# Patient Record
(demographics unavailable — no encounter records)

---

## 2025-02-05 NOTE — PHYSICAL EXAM
[FreeTextEntry1] : Vital signs were recorded and unremarkable.  Head neck, ear nose and throat is unremarkable.  Chest is clear heart sounds are normal respiratory rate is 16/min no labored breathing.  Mild pretibial edema is noted she is moderately obese.  Her neurological examination today is completely normal as delineated.  Specifically I did not notice any bulbar dysfunction fasciculations spasticity or hypotonia no Babinski sign normal station and gait. [General Appearance - In No Acute Distress] : in no acute distress [General Appearance - Alert] : alert [Oriented To Time, Place, And Person] : oriented to person, place, and time [Impaired Insight] : insight and judgment were intact [Affect] : the affect was normal [Person] : oriented to person [Place] : oriented to place [Time] : oriented to time [Concentration Intact] : normal concentrating ability [Visual Intact] : visual attention was ~T not ~L decreased [Naming Objects] : no difficulty naming common objects [Repeating Phrases] : no difficulty repeating a phrase [Writing A Sentence] : no difficulty writing a sentence [Fluency] : fluency intact [Comprehension] : comprehension intact [Reading] : reading intact [Past History] : adequate knowledge of personal past history [Cranial Nerves Optic (II)] : visual acuity intact bilaterally,  visual fields full to confrontation, pupils equal round and reactive to light [Cranial Nerves Oculomotor (III)] : extraocular motion intact [Cranial Nerves Trigeminal (V)] : facial sensation intact symmetrically [Cranial Nerves Vestibulocochlear (VIII)] : hearing was intact bilaterally [Cranial Nerves Facial (VII)] : face symmetrical [Cranial Nerves Glossopharyngeal (IX)] : tongue and palate midline [Cranial Nerves Accessory (XI - Cranial And Spinal)] : head turning and shoulder shrug symmetric [Cranial Nerves Hypoglossal (XII)] : there was no tongue deviation with protrusion [Motor Tone] : muscle tone was normal in all four extremities [Motor Strength] : muscle strength was normal in all four extremities [No Muscle Atrophy] : normal bulk in all four extremities [Sensation Tactile Decrease] : light touch was intact [Abnormal Walk] : normal gait [Balance] : balance was intact [Past-pointing] : there was no past-pointing [Tremor] : no tremor present [2+] : Ankle jerk left 2+ [Plantar Reflex Right Only] : normal on the right [Plantar Reflex Left Only] : normal on the left

## 2025-02-05 NOTE — DATA REVIEWED
[de-identified] : I reviewed her cervical spine MRI and advised her that there is no evidence of intra spinal disease with normal spinal cord and nerve roots. [de-identified] : I reviewed her EMG study and while she has history of fasciculations I did not notice any during the evaluation and the entire study was completely normal and she was reassured.

## 2025-02-05 NOTE — DATA REVIEWED
[de-identified] : I reviewed her cervical spine MRI and advised her that there is no evidence of intra spinal disease with normal spinal cord and nerve roots. [de-identified] : I reviewed her EMG study and while she has history of fasciculations I did not notice any during the evaluation and the entire study was completely normal and she was reassured.

## 2025-02-05 NOTE — PHYSICAL EXAM
[FreeTextEntry1] : Vital signs were recorded and unremarkable.  Head neck, ear nose and throat is unremarkable.  Chest is clear heart sounds are normal respiratory rate is 16/min no labored breathing.  Mild pretibial edema is noted she is moderately obese.  Her neurological examination today is completely normal as delineated.  Specifically I did not notice any bulbar dysfunction fasciculations spasticity or hypotonia no Babinski sign normal station and gait. [General Appearance - In No Acute Distress] : in no acute distress [General Appearance - Alert] : alert [Oriented To Time, Place, And Person] : oriented to person, place, and time [Impaired Insight] : insight and judgment were intact [Affect] : the affect was normal [Person] : oriented to person [Place] : oriented to place [Time] : oriented to time [Concentration Intact] : normal concentrating ability [Visual Intact] : visual attention was ~T not ~L decreased [Naming Objects] : no difficulty naming common objects [Repeating Phrases] : no difficulty repeating a phrase [Fluency] : fluency intact [Writing A Sentence] : no difficulty writing a sentence [Comprehension] : comprehension intact [Reading] : reading intact [Past History] : adequate knowledge of personal past history [Cranial Nerves Optic (II)] : visual acuity intact bilaterally,  visual fields full to confrontation, pupils equal round and reactive to light [Cranial Nerves Oculomotor (III)] : extraocular motion intact [Cranial Nerves Trigeminal (V)] : facial sensation intact symmetrically [Cranial Nerves Facial (VII)] : face symmetrical [Cranial Nerves Vestibulocochlear (VIII)] : hearing was intact bilaterally [Cranial Nerves Glossopharyngeal (IX)] : tongue and palate midline [Cranial Nerves Accessory (XI - Cranial And Spinal)] : head turning and shoulder shrug symmetric [Cranial Nerves Hypoglossal (XII)] : there was no tongue deviation with protrusion [Motor Tone] : muscle tone was normal in all four extremities [Motor Strength] : muscle strength was normal in all four extremities [No Muscle Atrophy] : normal bulk in all four extremities [Sensation Tactile Decrease] : light touch was intact [Abnormal Walk] : normal gait [Balance] : balance was intact [Past-pointing] : there was no past-pointing [Tremor] : no tremor present [2+] : Ankle jerk left 2+ [Plantar Reflex Right Only] : normal on the right [Plantar Reflex Left Only] : normal on the left

## 2025-02-05 NOTE — PHYSICAL EXAM
[FreeTextEntry1] : Vital signs were recorded and unremarkable.  Head neck, ear nose and throat is unremarkable.  Chest is clear heart sounds are normal respiratory rate is 16/min no labored breathing.  Mild pretibial edema is noted she is moderately obese.  Her neurological examination today is completely normal as delineated.  Specifically I did not notice any bulbar dysfunction fasciculations spasticity or hypotonia no Babinski sign normal station and gait. [General Appearance - Alert] : alert [General Appearance - In No Acute Distress] : in no acute distress [Oriented To Time, Place, And Person] : oriented to person, place, and time [Impaired Insight] : insight and judgment were intact [Affect] : the affect was normal [Person] : oriented to person [Place] : oriented to place [Time] : oriented to time [Concentration Intact] : normal concentrating ability [Visual Intact] : visual attention was ~T not ~L decreased [Naming Objects] : no difficulty naming common objects [Repeating Phrases] : no difficulty repeating a phrase [Fluency] : fluency intact [Writing A Sentence] : no difficulty writing a sentence [Comprehension] : comprehension intact [Reading] : reading intact [Past History] : adequate knowledge of personal past history [Cranial Nerves Oculomotor (III)] : extraocular motion intact [Cranial Nerves Optic (II)] : visual acuity intact bilaterally,  visual fields full to confrontation, pupils equal round and reactive to light [Cranial Nerves Trigeminal (V)] : facial sensation intact symmetrically [Cranial Nerves Facial (VII)] : face symmetrical [Cranial Nerves Vestibulocochlear (VIII)] : hearing was intact bilaterally [Cranial Nerves Glossopharyngeal (IX)] : tongue and palate midline [Cranial Nerves Accessory (XI - Cranial And Spinal)] : head turning and shoulder shrug symmetric [Cranial Nerves Hypoglossal (XII)] : there was no tongue deviation with protrusion [Motor Tone] : muscle tone was normal in all four extremities [No Muscle Atrophy] : normal bulk in all four extremities [Motor Strength] : muscle strength was normal in all four extremities [Sensation Tactile Decrease] : light touch was intact [Abnormal Walk] : normal gait [Balance] : balance was intact [Past-pointing] : there was no past-pointing [Tremor] : no tremor present [2+] : Ankle jerk left 2+ [Plantar Reflex Right Only] : normal on the right [Plantar Reflex Left Only] : normal on the left

## 2025-02-05 NOTE — DATA REVIEWED
[de-identified] : I reviewed her cervical spine MRI and advised her that there is no evidence of intra spinal disease with normal spinal cord and nerve roots. [de-identified] : I reviewed her EMG study and while she has history of fasciculations I did not notice any during the evaluation and the entire study was completely normal and she was reassured.

## 2025-02-05 NOTE — HISTORY OF PRESENT ILLNESS
[FreeTextEntry1] : Ms. 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 Cortes.  I also reassured her that MRI of the cervical spine was negative for any spinal cord dysfunction.  Recently her   of complications of cirrhosis of liver which was another depressive episode in her life however she is currently employed as a  in a [employer] firm working full-time and is able to carry out all activities of daily living including her profession and has been under the care of Dr. Carlie Daniel with yearly checkup and since her vitamin B12 levels were slightly low she is on supplementation including vitamin D.  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  She continues to experience sporadic muscle twitching but without any significant myalgia at this time and denied any headache diplopia dysarthria dysphagia dyspnea focal or lateralized weakness tingling or numbness neck muscle weakness.  Her height is 5 feet 5 inches and weight is 211 pounds and has gained approximately 15 pounds over the last several months and stated that she eats when she is stressed.  She has no children and stated that since 2024 there is a minimal way brain fog with disjointed thoughts when she wakes up at night but it has not affected her work as a  and her mother has not noticed it.  She had COVID infection in 2023 and the second episode 6 weeks later and was advised that sometimes COVID infection does cause mild brain fog but it could also be anxiety.  Review of systems is unremarkable and while she sporadically notices fasciculations there is no focal motor weakness in any part of her body including bulbar functions and limbs there is no spasticity abnormal involuntary movements.  Detailed review of system was otherwise unremarkable except for recent worsening of her anxiety and depression due to spousal loss but she is able to carry out all activities of daily living including working as a .  She is able to handle her finances.  I reviewed all her medications and allergies.  I also spoke with her mother who has not noticed any cognitive or language dysfunction focal or lateralized weakness or any other neurological symptoms including no evidence of cognitive decline.

## 2025-02-05 NOTE — HISTORY OF PRESENT ILLNESS
[FreeTextEntry1] : Ms. 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 Cortes.  I also reassured her that MRI of the cervical spine was negative for any spinal cord dysfunction.  Recently her   of complications of cirrhosis of liver which was another depressive episode in her life however she is currently employed as a  in a [employer] firm working full-time and is able to carry out all activities of daily living including her profession and has been under the care of Dr. Carlie Daniel with yearly checkup and since her vitamin B12 levels were slightly low she is on supplementation including vitamin D.  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  She continues to experience sporadic muscle twitching but without any significant myalgia at this time and denied any headache diplopia dysarthria dysphagia dyspnea focal or lateralized weakness tingling or numbness neck muscle weakness.  Her height is 5 feet 5 inches and weight is 211 pounds and has gained approximately 15 pounds over the last several months and stated that she eats when she is stressed.  She has no children and stated that since 2024 there is a minimal way brain fog with disjointed thoughts when she wakes up at night but it has not affected her work as a  and her mother has not noticed it.  She had COVID infection in 2023 and the second episode 6 weeks later and was advised that sometimes COVID infection does cause mild brain fog but it could also be anxiety.  Review of systems is unremarkable and while she sporadically notices fasciculations there is no focal motor weakness in any part of her body including bulbar functions and limbs there is no spasticity abnormal involuntary movements.  Detailed review of system was otherwise unremarkable except for recent worsening of her anxiety and depression due to spousal loss but she is able to carry out all activities of daily living including working as a .  She is able to handle her finances.  I reviewed all her medications and allergies.  I also spoke with her mother who has not noticed any cognitive or language dysfunction focal or lateralized weakness or any other neurological symptoms including no evidence of cognitive decline.

## 2025-02-05 NOTE — HISTORY OF PRESENT ILLNESS
[FreeTextEntry1] : Ms. 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 Cortes.  I also reassured her that MRI of the cervical spine was negative for any spinal cord dysfunction.  Recently her   of complications of cirrhosis of liver which was another depressive episode in her life however she is currently employed as a  in a [employer] firm working full-time and is able to carry out all activities of daily living including her profession and has been under the care of Dr. Carlie Daniel with yearly checkup and since her vitamin B12 levels were slightly low she is on supplementation including vitamin D.  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  She continues to experience sporadic muscle twitching but without any significant myalgia at this time and denied any headache diplopia dysarthria dysphagia dyspnea focal or lateralized weakness tingling or numbness neck muscle weakness.  Her height is 5 feet 5 inches and weight is 211 pounds and has gained approximately 15 pounds over the last several months and stated that she eats when she is stressed.  She has no children and stated that since 2024 there is a minimal way brain fog with disjointed thoughts when she wakes up at night but it has not affected her work as a  and her mother has not noticed it.  She had COVID infection in 2023 and the second episode 6 weeks later and was advised that sometimes COVID infection does cause mild brain fog but it could also be anxiety.  Review of systems is unremarkable and while she sporadically notices fasciculations there is no focal motor weakness in any part of her body including bulbar functions and limbs there is no spasticity abnormal involuntary movements.  Detailed review of system was otherwise unremarkable except for recent worsening of her anxiety and depression due to spousal loss but she is able to carry out all activities of daily living including working as a .  She is able to handle her finances.  I reviewed all her medications and allergies.  I also spoke with her mother who has not noticed any cognitive or language dysfunction focal or lateralized weakness or any other neurological symptoms including no evidence of cognitive decline.

## 2025-02-05 NOTE — DISCUSSION/SUMMARY
[FreeTextEntry1] : Opinion-the patient has historically benign fasciculation myalgia syndrome with significant anxiety and anxiety with depression with recent spousal loss but his functional without any evidence of neurological disorder such as motor neuron disease and she was reassured and advised to return back for repeat EMG study to reassure that there are no neuromuscular disorders.  She expressed understanding and will see Dr. Gitelman since her insurance has now changed and I had already forwarded her a referral.  She was advised to keep in touch by phone if there are any changes and had 5-minute counseling with her in the presence of her mother and reassured that there is no neuromuscular disorder at this time and will carefully watch her.

## 2025-03-26 NOTE — REASON FOR VISIT
[FreeTextEntry4] : 5:30 [FreeTextEntry5] : 6:15 [FreeTextEntry1] : Pt. is a 40 yo female with long-standing history of anxiety and medical history significant for Michelle Syndrome.   In March 2024 she developed muscle twitching in upper and lower extremities, eyelids and cheeks with accompanying muscle soreness, weakness, tingling and generalized fatigue.  Pt. has undergone multiple medical consultations with different specialists and various diagnositic tests/imagaing with no significant findings.  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  While pt. understand that this diagnosis is not serious or life threatening, she reports that she continues to experience tremendous anxiety around her symptoms.   In particular, she continues to have worries about the possibility of a motor neuron disease.  In addition to her health-related anxiety, pt. also suddenly lost her  4 months ago. Dr. Baig recommended that pt. see me for psychological evaluation and CBT oriented psychotherapy to help process her grief and manage her anxiety.

## 2025-03-26 NOTE — PLAN
[FreeTextEntry2] : 1. Pt. grieving after sudden loss of her  4 months ago. 2. Pt. also grieving the life she thought she would lead.  3. Pt. with significant health related anxiety. 4. Pt. with recurrent catastrophic thoughts and focus on her symptoms.  5. Pt. at times struggling to function due to anxiety. 6. Pt. struggling with self-care in the face of multiple stressors, anxiety and grief.  Will utilize a supportive and CBT approach to help pt. process her losses and work through her giref.  Will help pt. develop awareness of the mind-body connection and develop strategies for co-existing with rather than reacting to symptoms.  Will also work to shift pt. away from catastrophic thoughts thus decreasing health related anxiety.  Finally, will work to help pt. get back to healthy self-care behaviors to improve quality of life.  [de-identified] : Session conducted via teleheatl, mental status WNL.  Pt. reported ongoing anxiety and concern around symptoms but noted greater awareness of distortions and their impact on her emotions.  Reviewed examples that pt. observed throughout the week.  Introduced thought challenging techniques and utilized them with pt's examples.   [FreeTextEntry1] : Pt. to engage in short-term CBT oriented psychotherapy.

## 2025-03-26 NOTE — PLAN
[FreeTextEntry2] : 1. Pt. grieving after sudden loss of her  4 months ago. 2. Pt. also grieving the life she thought she would lead.  3. Pt. with significant health related anxiety. 4. Pt. with recurrent catastrophic thoughts and focus on her symptoms.  5. Pt. at times struggling to function due to anxiety. 6. Pt. struggling with self-care in the face of multiple stressors, anxiety and grief.  Will utilize a supportive and CBT approach to help pt. process her losses and work through her giref.  Will help pt. develop awareness of the mind-body connection and develop strategies for co-existing with rather than reacting to symptoms.  Will also work to shift pt. away from catastrophic thoughts thus decreasing health related anxiety.  Finally, will work to help pt. get back to healthy self-care behaviors to improve quality of life.  [de-identified] : Session conducted via teleheatl, mental status WNL.  Pt. reported ongoing anxiety and concern around symptoms but noted greater awareness of distortions and their impact on her emotions.  Reviewed examples that pt. observed throughout the week.  Introduced thought challenging techniques and utilized them with pt's examples.   [FreeTextEntry1] : Pt. to engage in short-term CBT oriented psychotherapy.

## 2025-04-07 NOTE — PLAN
[FreeTextEntry2] : 1. Pt. grieving after sudden loss of her  4 months ago. 2. Pt. also grieving the life she thought she would lead.  3. Pt. with significant health related anxiety. 4. Pt. with recurrent catastrophic thoughts and focus on her symptoms.  5. Pt. at times struggling to function due to anxiety. 6. Pt. struggling with self-care in the face of multiple stressors, anxiety and grief.  Will utilize a supportive and CBT approach to help pt. process her losses and work through her giref.  Will help pt. develop awareness of the mind-body connection and develop strategies for co-existing with rather than reacting to symptoms.  Will also work to shift pt. away from catastrophic thoughts thus decreasing health related anxiety.  Finally, will work to help pt. get back to healthy self-care behaviors to improve quality of life.  [Cognitive and/or Behavior Therapy] : Cognitive and/or Behavior Therapy  [Supportive Therapy] : Supportive Therapy [de-identified] : Session conducted via teleheatl, mental status WNL.  Pt. reported ongoing anxiety and concern around symptoms but noted greater awareness of distortions and their impact on her emotions.  Reviewed examples that pt. observed throughout the week.  Introduced thought challenging techniques and utilized them with pt's examples.   [FreeTextEntry1] : Pt. to engage in short-term CBT oriented psychotherapy.

## 2025-04-07 NOTE — PLAN
[FreeTextEntry2] : 1. Pt. grieving after sudden loss of her  4 months ago. 2. Pt. also grieving the life she thought she would lead.  3. Pt. with significant health related anxiety. 4. Pt. with recurrent catastrophic thoughts and focus on her symptoms.  5. Pt. at times struggling to function due to anxiety. 6. Pt. struggling with self-care in the face of multiple stressors, anxiety and grief.  Will utilize a supportive and CBT approach to help pt. process her losses and work through her giref.  Will help pt. develop awareness of the mind-body connection and develop strategies for co-existing with rather than reacting to symptoms.  Will also work to shift pt. away from catastrophic thoughts thus decreasing health related anxiety.  Finally, will work to help pt. get back to healthy self-care behaviors to improve quality of life.  [Cognitive and/or Behavior Therapy] : Cognitive and/or Behavior Therapy  [Supportive Therapy] : Supportive Therapy [de-identified] : Session conducted via teleheatl, mental status WNL.  Pt. reported ongoing anxiety and concern around symptoms but noted greater awareness of distortions and their impact on her emotions.  Reviewed examples that pt. observed throughout the week.  Introduced thought challenging techniques and utilized them with pt's examples.   [FreeTextEntry1] : Pt. to engage in short-term CBT oriented psychotherapy.

## 2025-04-07 NOTE — PHYSICAL EXAM
[Average] : average [Cooperative] : cooperative [Euthymic] : euthymic [Full] : full [Clear] : clear [Linear/Goal Directed] : linear/goal directed [Above average] : above average [WNL] : within normal limits

## 2025-04-07 NOTE — REASON FOR VISIT
[Patient preference] : as per patient preference [Telehealth (audio & video) - Individual/Group] : This visit was provided via telehealth using real-time 2-way audio visual technology. [Other Location: e.g. Home (Enter Location, City,State)___] : The provider was located at [unfilled]. [Home] : The patient, [unfilled], was located at home, [unfilled], at the time of the visit. [Participant(s) identity verified] : Participant(s) identity verified. [Verbal consent obtained from patient/other participant(s)] : Verbal consent for telehealth/telephonic services obtained from patient/other participant(s) [FreeTextEntry4] : 5:30 [FreeTextEntry5] : 6:15 [Patient] : Patient [FreeTextEntry1] : Pt. is a 40 yo female with long-standing history of anxiety and medical history significant for Michelle Syndrome.   In March 2024 she developed muscle twitching in upper and lower extremities, eyelids and cheeks with accompanying muscle soreness, weakness, tingling and generalized fatigue.  Pt. has undergone multiple medical consultations with different specialists and various diagnositic tests/imagaing with no significant findings.  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  While pt. understand that this diagnosis is not serious or life threatening, she reports that she continues to experience tremendous anxiety around her symptoms.   In particular, she continues to have worries about the possibility of a motor neuron disease.  In addition to her health-related anxiety, pt. also suddenly lost her  4 months ago. Dr. Baig recommended that pt. see me for psychological evaluation and CBT oriented psychotherapy to help process her grief and manage her anxiety.

## 2025-04-15 NOTE — PLAN
[FreeTextEntry2] : 1. Pt. grieving after sudden loss of her  4 months ago. 2. Pt. also grieving the life she thought she would lead.  3. Pt. with significant health related anxiety. 4. Pt. with recurrent catastrophic thoughts and focus on her symptoms.  5. Pt. at times struggling to function due to anxiety. 6. Pt. struggling with self-care in the face of multiple stressors, anxiety and grief.  Will utilize a supportive and CBT approach to help pt. process her losses and work through her giref.  Will help pt. develop awareness of the mind-body connection and develop strategies for co-existing with rather than reacting to symptoms.  Will also work to shift pt. away from catastrophic thoughts thus decreasing health related anxiety.  Finally, will work to help pt. get back to healthy self-care behaviors to improve quality of life.  [Cognitive and/or Behavior Therapy] : Cognitive and/or Behavior Therapy  [Supportive Therapy] : Supportive Therapy [de-identified] : Session conducted via teleheatlh, mental status WNL.  Pt. discussed ongoing struggle with myalgias and fasciculations.  Provided psychoeducation on fight and flight response and discussed shift in thinking about symptoms to shift away from fear response.  [FreeTextEntry1] : Pt. to engage in short-term CBT oriented psychotherapy.

## 2025-04-25 NOTE — PLAN
[FreeTextEntry2] : 1. Pt. grieving after sudden loss of her  4 months ago. 2. Pt. also grieving the life she thought she would lead.  3. Pt. with significant health related anxiety. 4. Pt. with recurrent catastrophic thoughts and focus on her symptoms.  5. Pt. at times struggling to function due to anxiety. 6. Pt. struggling with self-care in the face of multiple stressors, anxiety and grief.  Will utilize a supportive and CBT approach to help pt. process her losses and work through her giref.  Will help pt. develop awareness of the mind-body connection and develop strategies for co-existing with rather than reacting to symptoms.  Will also work to shift pt. away from catastrophic thoughts thus decreasing health related anxiety.  Finally, will work to help pt. get back to healthy self-care behaviors to improve quality of life.  [Cognitive and/or Behavior Therapy] : Cognitive and/or Behavior Therapy  [Supportive Therapy] : Supportive Therapy [de-identified] : Session conducted via teleheatlh, mental status WNL.  Pt. discussed struggle with grief - particularly around Easter and her upcoming birthday.  As pt. discussed grief around the loss of a future family, utilized CBT approach to help pt. maintain mental flexibility.  Pt. noted benefit from utilizing mindfulness.  Engaged pt. in somatic tracking exercise.  [FreeTextEntry1] : Pt. to engage in short-term CBT oriented psychotherapy.

## 2025-05-16 NOTE — PLAN
[FreeTextEntry2] : 1. Pt. grieving after sudden loss of her  4 months ago. 2. Pt. also grieving the life she thought she would lead.  3. Pt. with significant health related anxiety. 4. Pt. with recurrent catastrophic thoughts and focus on her symptoms.  5. Pt. at times struggling to function due to anxiety. 6. Pt. struggling with self-care in the face of multiple stressors, anxiety and grief.  Will utilize a supportive and CBT approach to help pt. process her losses and work through her giref.  Will help pt. develop awareness of the mind-body connection and develop strategies for co-existing with rather than reacting to symptoms.  Will also work to shift pt. away from catastrophic thoughts thus decreasing health related anxiety.  Finally, will work to help pt. get back to healthy self-care behaviors to improve quality of life.  [Cognitive and/or Behavior Therapy] : Cognitive and/or Behavior Therapy  [Supportive Therapy] : Supportive Therapy [de-identified] : Session conducted via teleheatlh, mental status WNL.  Utilized supportive approach to process pt's birthday.  Pt. noted benefit from efforts to set expectations and  arrange for support. While there were moments where the grief was difficult, pt. reported overall progress - with decrease in anxiety and improvement in coping with symptoms.  Pt. also noted benefit form use of mindfulness.  [FreeTextEntry1] : Pt. to engage in short-term CBT oriented psychotherapy.

## 2025-05-16 NOTE — PLAN
[FreeTextEntry2] : 1. Pt. grieving after sudden loss of her  4 months ago. 2. Pt. also grieving the life she thought she would lead.  3. Pt. with significant health related anxiety. 4. Pt. with recurrent catastrophic thoughts and focus on her symptoms.  5. Pt. at times struggling to function due to anxiety. 6. Pt. struggling with self-care in the face of multiple stressors, anxiety and grief.  Will utilize a supportive and CBT approach to help pt. process her losses and work through her giref.  Will help pt. develop awareness of the mind-body connection and develop strategies for co-existing with rather than reacting to symptoms.  Will also work to shift pt. away from catastrophic thoughts thus decreasing health related anxiety.  Finally, will work to help pt. get back to healthy self-care behaviors to improve quality of life.  [Cognitive and/or Behavior Therapy] : Cognitive and/or Behavior Therapy  [Supportive Therapy] : Supportive Therapy [de-identified] : Session conducted via telehealth, mental status WNL.  Pt. reported some worsening of anxiety and symptoms over the week.  As we explored potential triggers, normalized the ebbs and flows of sensations.  Utilized metaphor of faulty alarm to help pt. to continue to develop an alternative relationship with her symptoms.  Encouraged pt. to focus on ways to manage and live with symptoms rather than to focus on analyzing and fixing symptoms.  [FreeTextEntry1] : Pt. to engage in short-term CBT oriented psychotherapy.

## 2025-06-06 NOTE — PLAN
[FreeTextEntry2] : 1. Pt. grieving after sudden loss of her  4 months ago. 2. Pt. also grieving the life she thought she would lead.  3. Pt. with significant health related anxiety. 4. Pt. with recurrent catastrophic thoughts and focus on her symptoms.  5. Pt. at times struggling to function due to anxiety. 6. Pt. struggling with self-care in the face of multiple stressors, anxiety and grief.  Will utilize a supportive and CBT approach to help pt. process her losses and work through her giref.  Will help pt. develop awareness of the mind-body connection and develop strategies for co-existing with rather than reacting to symptoms.  Will also work to shift pt. away from catastrophic thoughts thus decreasing health related anxiety.  Finally, will work to help pt. get back to healthy self-care behaviors to improve quality of life.  [Cognitive and/or Behavior Therapy] : Cognitive and/or Behavior Therapy  [Supportive Therapy] : Supportive Therapy [de-identified] : Session conducted via telehealth, mental status WNL.  Pt. reported having an extremely difficult week.  Explored potentially triggering factors and encouraged pt. to find compassion for herself.  Normalized range of emotions in the context of her grief.   [FreeTextEntry1] : Pt. to engage in short-term CBT oriented psychotherapy.

## 2025-06-22 NOTE — PLAN
[FreeTextEntry2] : 1. Pt. grieving after sudden loss of her  4 months ago. 2. Pt. also grieving the life she thought she would lead.  3. Pt. with significant health related anxiety. 4. Pt. with recurrent catastrophic thoughts and focus on her symptoms.  5. Pt. at times struggling to function due to anxiety. 6. Pt. struggling with self-care in the face of multiple stressors, anxiety and grief.  Will utilize a supportive and CBT approach to help pt. process her losses and work through her giref.  Will help pt. develop awareness of the mind-body connection and develop strategies for co-existing with rather than reacting to symptoms.  Will also work to shift pt. away from catastrophic thoughts thus decreasing health related anxiety.  Finally, will work to help pt. get back to healthy self-care behaviors to improve quality of life.  [Cognitive and/or Behavior Therapy] : Cognitive and/or Behavior Therapy  [Supportive Therapy] : Supportive Therapy [de-identified] : Session conducted via telehealth, mental status WNL.  Pt. 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  Processed grief triggered by the shower - normalizing her struggle.  As in past session, encouraged pt. to remain open to possibilities of living a life close to her values in the future.  Re-visited discussion around stress management and use of PMR exercise.  Encouraged practice morning and night.  [FreeTextEntry1] : Pt. to engage in short-term CBT oriented psychotherapy.

## 2025-07-08 NOTE — PLAN
[FreeTextEntry2] : 1. Pt. grieving after sudden loss of her  4 months ago. 2. Pt. also grieving the life she thought she would lead.  3. Pt. with significant health related anxiety. 4. Pt. with recurrent catastrophic thoughts and focus on her symptoms.  5. Pt. at times struggling to function due to anxiety. 6. Pt. struggling with self-care in the face of multiple stressors, anxiety and grief.  Will utilize a supportive and CBT approach to help pt. process her losses and work through her giref.  Will help pt. develop awareness of the mind-body connection and develop strategies for co-existing with rather than reacting to symptoms.  Will also work to shift pt. away from catastrophic thoughts thus decreasing health related anxiety.  Finally, will work to help pt. get back to healthy self-care behaviors to improve quality of life.  [Cognitive and/or Behavior Therapy] : Cognitive and/or Behavior Therapy  [Supportive Therapy] : Supportive Therapy [de-identified] : Session conducted via telehealth, mental status WNL.  Utilized supportive approach as pt. 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  Empathized with her struggle while also noting the "light at the end of the tunnel".  Processed various sources of stress and utilized a problem solving approach to discuss ways to manage the stressors. [FreeTextEntry1] : Pt. to engage in short-term CBT oriented psychotherapy.

## 2025-07-11 NOTE — PLAN
[FreeTextEntry2] : 1. Pt. grieving after sudden loss of her  4 months ago. 2. Pt. also grieving the life she thought she would lead.  3. Pt. with significant health related anxiety. 4. Pt. with recurrent catastrophic thoughts and focus on her symptoms.  5. Pt. at times struggling to function due to anxiety. 6. Pt. struggling with self-care in the face of multiple stressors, anxiety and grief.  Will utilize a supportive and CBT approach to help pt. process her losses and work through her giref.  Will help pt. develop awareness of the mind-body connection and develop strategies for co-existing with rather than reacting to symptoms.  Will also work to shift pt. away from catastrophic thoughts thus decreasing health related anxiety.  Finally, will work to help pt. get back to healthy self-care behaviors to improve quality of life.  [Cognitive and/or Behavior Therapy] : Cognitive and/or Behavior Therapy  [Supportive Therapy] : Supportive Therapy [de-identified] : Session conducted via telehealth, mental status WNL.  Utilized supportive approach as pt. 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  Empathized with her struggle while also noting the "light at the end of the tunnel".  Processed various sources of stress and utilized a problem solving approach to discuss ways to manage the stressors. [FreeTextEntry1] : Pt. to engage in short-term CBT oriented psychotherapy.

## 2025-07-18 NOTE — REASON FOR VISIT
[Patient preference] : as per patient preference [Telehealth (audio & video) - Individual/Group] : This visit was provided via telehealth using real-time 2-way audio visual technology. [Other Location: e.g. Home (Enter Location, City,State)___] : The provider was located at [unfilled]. [Home] : The patient, [unfilled], was located at home, [unfilled], at the time of the visit. [Participant(s) identity verified] : Participant(s) identity verified. [Verbal consent obtained from patient/other participant(s)] : Verbal consent for telehealth/telephonic services obtained from patient/other participant(s) [FreeTextEntry4] : 5:30 [FreeTextEntry5] : 6:15 [Patient] : Patient [FreeTextEntry1] : Pt. is a 38 yo female with long-standing history of anxiety and medical history significant for Michelle Syndrome.   In March 2024 she developed muscle twitching in upper and lower extremities, eyelids and cheeks with accompanying muscle soreness, weakness, tingling and generalized fatigue.  Pt. has undergone multiple medical consultations with different specialists and various diagnositic tests/imagaing with no significant findings.  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  While pt. understand that this diagnosis is not serious or life threatening, she reports that she continues to experience tremendous anxiety around her symptoms.   In particular, she continues to have worries about the possibility of a motor neuron disease.  In addition to her health-related anxiety, pt. also suddenly lost her  4 months ago. Dr. Baig recommended that pt. see me for psychological evaluation and CBT oriented psychotherapy to help process her grief and manage her anxiety.

## 2025-07-18 NOTE — PLAN
[FreeTextEntry2] : 1. Pt. grieving after sudden loss of her  4 months ago. 2. Pt. also grieving the life she thought she would lead.  3. Pt. with significant health related anxiety. 4. Pt. with recurrent catastrophic thoughts and focus on her symptoms.  5. Pt. at times struggling to function due to anxiety. 6. Pt. struggling with self-care in the face of multiple stressors, anxiety and grief.  Will utilize a supportive and CBT approach to help pt. process her losses and work through her giref.  Will help pt. develop awareness of the mind-body connection and develop strategies for co-existing with rather than reacting to symptoms.  Will also work to shift pt. away from catastrophic thoughts thus decreasing health related anxiety.  Finally, will work to help pt. get back to healthy self-care behaviors to improve quality of life.  [Cognitive and/or Behavior Therapy] : Cognitive and/or Behavior Therapy  [Supportive Therapy] : Supportive Therapy [de-identified] : Session conducted via telehealth, mental status WNL.  Revisited discussion from previous session around possible worsening depression.  Pt. noted significant improvement in mood with completion of construction and her ability to settle back into her routine.  Encouraged pt. to continue to monitor mood and anxiety in order to still evaluate benefit of psychiatric consultation.  Pt. noted ongoing struggle at times with sensations/somatic symptoms.  Encouraged use of thought journal to challenge catastrophic thoughts.   [FreeTextEntry1] : Pt. to engage in short-term CBT oriented psychotherapy.